# Patient Record
Sex: FEMALE | Race: WHITE
[De-identification: names, ages, dates, MRNs, and addresses within clinical notes are randomized per-mention and may not be internally consistent; named-entity substitution may affect disease eponyms.]

---

## 2020-03-14 ENCOUNTER — HOSPITAL ENCOUNTER (EMERGENCY)
Dept: HOSPITAL 25 - UCEAST | Age: 60
Discharge: HOME | End: 2020-03-14
Payer: COMMERCIAL

## 2020-03-14 VITALS — SYSTOLIC BLOOD PRESSURE: 112 MMHG | DIASTOLIC BLOOD PRESSURE: 78 MMHG
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
## 2020-03-14 DIAGNOSIS — Z91.030: ICD-10-CM

## 2020-03-14 DIAGNOSIS — S63.610A: Primary | ICD-10-CM
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
## 2020-03-14 DIAGNOSIS — Z88.1: ICD-10-CM

## 2020-03-14 PROCEDURE — G0463 HOSPITAL OUTPT CLINIC VISIT: HCPCS

## 2020-03-14 PROCEDURE — 99212 OFFICE O/P EST SF 10 MIN: CPT

## 2020-03-14 NOTE — XMS REPORT
Summary of Care

 Created on:2020



Patient:Jennifer Mccauley

Sex:Female

:1960

External Reference #:[id number]





Demographics







 Address  [address] [place] [address]

 

 Home Phone  [phone]

 

 Work Phone  [phone]

 

 Mobile Phone  [phone]

 

 Email Address  [email or web address]

 

 Preferred Language  English

 

 Marital Status  Not  or 

 

 Zoroastrian Affiliation  Unknown

 

 Race  White

 

 Ethnic Group  Not  or 









Author







 Organization  [facility]

 

 Address  [address] [place] [address]









Support







 Name  Relationship  Address  Phone

 

 Dawn Meyer  Spouse  [address]  [phone]



     [place] [address]  









Care Team Providers







 Name  Role  Phone

 

 Liz Julia YASIR ROMERO  Primary Care Provider  [phone]









Reason for Visit







 Reason  Comments

 

 Follow-up  OPSP Bilateral foot hammer toes







Encounter Details







 Date  Type  Department  Care Team  Description

 

 2020  Office Visit  Gerardo Huerta Daniel  Bilateral 
foot pain (Primary Dx);



     LLP



  M, [place]



  Hammer toes of both feet;



     [address]



  [address]



  S/P foot surgery, right



     [place], [place]  [address]



  



     [address]



  [place], [place]  



     [phone]  [address]



  



       [phone]  



       (Fax)  







Allergies







 Active Allergy  Reactions  Severity  Noted Date  Comments

 

 Bee Pollen  Anaphylaxis  High  2016  

 

 Levofloxacin In D5w  Hives    2016  

 

 Adhesive Tape  Other (See Comments)    2018  Blisters



documented as of this encounter (statuses as of 2020)



Medications







 Medication  Sig  Dispensed  Refills  Start Date  End Date  Status

 

 EPINEPHrine  Inject 0.3    0      Active



 (EPIPEN 1:1000)  mg into the          



 0.3 MG/0.3ML SOAJ  muscle.          

 

 linaclotide  Take 290 mcg    0      Active



 (LINZESS) 290 MCG  by mouth.          



 capsule            

 

 budesonide  Inhale 1 mg    0      Active



 (PULMICORT) 1  into the          



 MG/2ML nebulizer  lungs.          



 solution            

 

 levalbuterol  Inhale  into    0      Active



 (XOPENEX) 0.63  the lungs.          



 MG/3ML nebulizer            



 solution            

 

 montelukast      0  2016    Active



 (SINGULAIR) 10 MG            



 tablet            

 

 Motegrity 2 MG      0  2019    Active



 Oral Tablet            

 

 Doxycycline      0  2020  Discontinued



 Hyclate 100 MG          0  (Therapy



 Oral Tablet            completed)



 (VIBRA-TABS)            



documented as of this encounter (statuses as of 2020)



Active Problems







 Problem  Noted Date

 

 Contusion of right hand  2018

 

 Numbness and tingling in right hand  2018



documented as of this encounter (statuses as of 2020)



Social History







 Tobacco Use  Types  Packs/Day  Years Used  Date

 

 Never Smoker    0    









 Smokeless Tobacco: Never Used      









 Alcohol Use  Drinks/Week  oz/Week  Comments

 

 Yes      









 Sex Assigned at Birth  Date Recorded

 

 Not on file  









 Job Start Date  Occupation  Industry

 

 Not on file  Not on file  Not on file









 Travel History  Travel Start  Travel End









 No recent travel history available.



documented as of this encounter



Last Filed Vital Signs

Not on filedocumented in this encounter



Progress Notes

Duy Carrillo, PA - 2020  3:00 PM ESTFormatting of this note might 
be different from the original.

Chief Complaint

Patient presents with

 Follow-up

  OPSP Bilateral foot hammer toes



HPI: Jennifer was seen on behalf of  Dr. Jesus for evaluation of their  
Bilateral feet. Patient is status post remote right great toe fusion and 
subsequent hardware removal.  Hardware removal in .  Great toe fusion was 
in .  She has had progressive issues with her second and third toeson both 
feet.  Worse on the left than on the right.  States are starting to bend 
upwards.  They are rubbing on shoes.  They bother her when her when she runs 
and she wears certain shoes.  She denies numbness, tingly.  No new injuries.  
She has had no formal treatment.



Past Medical History:

Diagnosis Date

 Asthma

 IBS (irritable bowel syndrome)



Family History

Problem Relation Age of Onset

 Asthma Mother

 Thyroid disease Mother

 Heart disease Father

 Stroke Sister

 Cancer Paternal Grandfather

 Diabetes Paternal Grandfather



Past Surgical History:

Procedure Laterality Date

 FOOT SURGERY Right 2017

 removal of hardware- [facility]

 FOOT SURGERY Right

 melanoma  right big toe

 HAND SURGERY

 KNEE SURGERY Right 2019

 vocal cord nodules



Social History



Socioeconomic History

 Marital status: 

  Spouse name: Not on file

 Number of children: Not on file

 Years of education: Not on file

 Highest education level: Not on file

Occupational History

 Not on file

Social Needs

 Financial resource strain: Not on file

 Food insecurity:

  Worry: Not on file

  Inability: Not on file

 Transportation needs:

  Medical: Not on file

  Non-medical: Not on file

Tobacco Use

 Smoking status: Never Smoker

 Smokeless tobacco: Never Used

Substance and Sexual Activity

 Alcohol use: Yes

 Drug use: No

 Sexual activity: Not on file

Lifestyle

 Physical activity:

  Days per week: Not on file

  Minutes per session: Not on file

 Stress: Not on file

Relationships

 Social connections:

  Talks on phone: Not on file

  Gets together: Not on file

  Attends Anglican service: Not on file

  Active member of club or organization: Not on file

  Attends meetings of clubs or organizations: Not on file

  Relationship status: Not on file

 Intimate partner violence:

  Fear of current or ex partner: Not on file

  Emotionally abused: Not on file

  Physically abused: Not on file

  Forced sexual activity: Not on file

Other Topics Concern

 Not on file

Social History Narrative

 Not on file



Allergies

Allergen Reactions

 Bee Pollen Anaphylaxis

 Levaquin [Levofloxacin In D5w] Hives

 Tape [Adhesive Tape] Other (See Comments)

  Blisters



Current Outpatient Medications on File Prior to Visit

Medication Sig Dispense Refill

 budesonide (PULMICORT) 1 MG/2ML nebulizer solution Inhale 1 mg into the 
lungs.

 EPINEPHrine (EPIPEN 1:1000) 0.3 MG/0.3ML SOAJ Inject 0.3 mg into the 
muscle.

 levalbuterol (XOPENEX) 0.63 MG/3ML nebulizer solution Inhale  into the 
lungs.

 linaclotide (LINZESS) 290 MCG capsule Take 290 mcg by mouth.

 montelukast (SINGULAIR) 10 MG tablet

 Motegrity 2 MG Oral Tablet

 [DISCONTINUED] Doxycycline Hyclate 100 MG Oral Tablet (VIBRA-TABS)



No current facility-administered medications on file prior to visit.



Please see new patient information sheet for review of systems. These were 
reviewed today with Jennifer.



PHYSICAL EXAM:  There were no vitals taken for this visit.

Patient is a well developed, well nourished 59-year-old in no acute distress, 
sitting comfortably onthe exam table today.  The patient is alert and oriented, 
responds to questions appropriately, Walkswith a normal gait.  The patient has 
proper mood and affect.



Bilateral foot and ankle exam show hammertoe deformities of toes 2 and 3.  
Worse on the left than onthe right.  The deformities are mostly correctable.  
There is no tenderness over the metatarsal heads plantarly.  There is no 
movement at the first MTPJ on the right foot.  The second toe is stiff to the 
IPJ.



XRAYS: Multiple views of the bilateral feet were Reviewed in the office today.  
No acute findings.  Hammertoes of toes 2 on both sides.  Status post first MTPJ 
fusion with good bony fusion.  No hardware.



ASSESSMENT: symptomatic bilateral hammertoes



PLAN:  Treatment options were discussed. The natural history of the patient's 
problem was discussed with Jennifer.   We discussed treatment options.  She 
trialed the Dubin splint.  This was not very comfortable for her.  She is given 
a toe sleeve to protect the skin so it does not rub on shoes.  She was taught 
stretching.  She she is to avoid walking barefoot.  She understands that the 
only solution for this is surgical interventions.  She is not sure that she 
wants to pursue that at this time.  Sheunderstands should be strict 
nonweightbearing for 2 weeks and be in a boot for 6 weeks with a pin sticking 
out of her second toe.  She did inquire about operating on both sides of the 
same time or possibly coordinating with a right knee arthroscopy that needs to 
be done.  She will think about her options.  Return to office when necessary.  
Patient will call the office for any increasing troubles.



F/U Xrays: none



"This document was dictated using [employer] software.  
A reasonable attempt at proof reading has been made to minimize errors.  Please 
call our office if you have any questions."Electronically signed by ROM Painter at 2020  4:45 PM ESTdocumented in this encounter



Plan of Treatment







 Date  Type  Specialty  Care Team  Description

 

 2020  Office Visit  Orthopedic Surgery  Matthew Montanez MD



  



       [address] [address]



  



       [phone] (Fax)  









 Name  Type  Priority  Associated Diagnoses  Date/Time

 

 XR Foot 3 or More Views  Imaging  Routine  Bilateral foot pain  2020  3:
26 PM EST



 Bilateral        









 Name  Type  Priority  Associated Diagnoses  Order Schedule

 

 XR Foot 3 or More Views  Imaging  Routine  Bilateral foot pain  Expected: ,



 Bilateral        Expires: 2022









 Health Maintenance  Due Date  Last Done  Comments

 

 Hepatitis C Screening (B.  [birth year])      

 

 MMR Vaccines (1 of 1 - Standard  1961    



 series)      

 

 Varicella Vaccines (1 of 2 -  1961    



 2-dose childhood series)      

 

 DTaP,Tdap,and Td Vaccines (1 -  1967    



 Tdap)      

 

 HIV Screening  1973    

 

 Cervical Cancer Screening 5 years  1981    

 

 Breast Cancer Screening 2 years  2010    

 

 Colon Cancer Screening 10 yrs  2010    

 

 Influenza Vaccine  10/01/2019    

 

 Pneumococcal Vaccine: 65+ Years (1  2025    



 of 2 - PCV13)      

 

 HIB Vaccines  Aged Out    No longer eligible based on



       patient's age to complete this



       topic

 

 Hepatitis A Vaccines  Aged Out    No longer eligible based on



       patient's age to complete this



       topic

 

 Hepatitis B Vaccines  Aged Out    No longer eligible based on



       patient's age to complete this



       topic

 

 IPV Vaccines  Aged Out    No longer eligible based on



       patient's age to complete this



       topic

 

 Pneumococcal Vaccine: Pediatrics  Aged Out    No longer eligible based on



 (0 to 5 Years) and At-Risk      patient's age to complete this



 Patients (6 to 64 Years)      topic



documented as of this encounter



Results

Not on filedocumented in this encounter



Visit Diagnoses







 Diagnosis

 

 Bilateral foot pain - Primary







 Pain in limb

 

 Hammer toes of both feet

 

 S/P foot surgery, right



documented in this encounter

## 2020-03-14 NOTE — XMS REPORT
Summary of Care

 Created on:2020



Patient:Jennifer Mccauley

Sex:Female

:1960

External Reference #:[id number]





Demographics







 Address  [address] [place] [address]

 

 Home Phone  [phone]

 

 Work Phone  [phone]

 

 Mobile Phone  [phone]

 

 Email Address  [email or web address]

 

 Preferred Language  English

 

 Marital Status  Not  or 

 

 Gnosticist Affiliation  Unknown

 

 Race  White

 

 Ethnic Group  Not  or 









Author







 Organization  [facility]

 

 Address  [address] [place] [address]









Support







 Name  Relationship  Address  Phone

 

 Dawn Meyer  Spouse  [address] [facility]  [phone]



     [place] [address]  









Care Team Providers







 Name  Role  Phone

 

 Celialeno Julia YASIR ROMERO  Primary Care Provider  [phone]









Reason for Referral

External Surgery Case (Routine)





 Status  Reason  Specialty  Diagnoses / Procedures  Referred By Contact  
Referred To Contact

 

 Open      Diagnoses



S/P medial meniscectomy of right knee



Right anterior knee pain  Matthew Montanez,  



       



Procedures



Surgery Case Request, Outside Facilty ONLY  MD



  



         [address] [place] [address] [address]



  



         [place], [place]  



         [address]



  



         Phone: [phone]



  



         Fax: [phone]



  



         Email:  



         [email or web address]  









Reason for Visit







 Reason  Comments

 

 Follow-up  increased right knee pain







Encounter Details







 Date  Type  Department  Care Team  Description

 

 2020  Office Visit  [facility] Lisseth Umanzor John  S/P medial 
meniscectomy of right knee (Primary Dx);



     SULLY PLATA MD



  Right anterior knee pain



     [address] [facility] [address]



  [address], [place]  [address]



  



     [address]



  [facility]  



     [phone]  [place] [address]



  



       [phone]  



       (Fax)  







Allergies







 Active Allergy  Reactions  Severity  Noted Date  Comments

 

 Bee Pollen  Anaphylaxis  High  2016  

 

 Levofloxacin In D5w  Hives    2016  

 

 Adhesive Tape  Other (See Comments)    2018  Blisters



documented as of this encounter (statuses as of 2020)



Medications







 Medication  Sig  Dispensed  Refills  Start Date  End Date  Status

 

 EPINEPHrine (EPIPEN  Inject 0.3 mg    0      Active



 1:1000) 0.3 MG/0.3ML  into the muscle.          



 SOAJ            

 

 linaclotide (LINZESS)  Take 290 mcg by    0      Active



 290 MCG capsule  mouth.          

 

 budesonide (PULMICORT)  Inhale 1 mg into    0      Active



 1 MG/2ML nebulizer  the lungs.          



 solution            

 

 levalbuterol (XOPENEX)  Inhale  into the    0      Active



 0.63 MG/3ML nebulizer  lungs.          



 solution            

 

 montelukast (SINGULAIR)      0  2016    Active



 10 MG tablet            

 

 Motegrity 2 MG Oral      0  2019    Active



 Tablet            



documented as of this encounter (statuses as of 2020)



Active Problems







 Problem  Noted Date

 

 Contusion of right hand  2018

 

 Numbness and tingling in right hand  2018



documented as of this encounter (statuses as of 2020)



Social History







 Tobacco Use  Types  Packs/Day  Years Used  Date

 

 Never Smoker    0    









 Smokeless Tobacco: Never Used      









 Alcohol Use  Drinks/Week  oz/Week  Comments

 

 Yes      









 Sex Assigned at Birth  Date Recorded

 

 Not on file  









 Job Start Date  Occupation  Industry

 

 Not on file  Not on file  Not on file









 Travel History  Travel Start  Travel End









 No recent travel history available.



documented as of this encounter



Last Filed Vital Signs

Not on filedocumented in this encounter



Patient Instructions

Patient InstructionsSt Cristy Briggs LPN - 2020  4:00 PM ESTThe 
patient is instructed to call the office with any question/concerns or if 
symptoms worsen.

Electronically signed by Cristy Krishna LPN at 2020  4:08 PM EST

documented in this encounter



Progress Notes

Danyel Lee - 2020  4:00 PM ESTFormatting of this note might be 
different from the original.

Danyel Lee scribing the following service on behalf of Dr. Montanez.



Chief Complaint

Patient presents with

 Follow-up

  increased right knee pain



Jennifer Mccauley comes to the office for increased right knee pain. Her 
pain is medial and posterior. She continues to have increased pain at work. No 
new injury. no improvement with PT or steroid injection. No consitutional or 
neurological complaints.



Right knee: well-healed incisions. No effusion. Medial tenderness.  Good 
motion.  Stable. Patella tracks straight.  Calf is soft and nontender.  
Neurovascularly intact.



A 59 y.o. female s/p right knee arthroscopy and partial medial menisectomy, 
doing well.



Inherent/material risks, benefits, limitations and the treatment alternatives 
were discussed at length.  All questions were answered. She would like to 
proceed with repeat right knee arthroscopy and meniscal correction. Jennifer 
will return to  her primary care team  for preoperative clearance. She will 
speak to my  to schedule her surgery.



I, Matthew Montanez MD, have examined the patient and discussed relevant 
clinical findings. I agree with the above stated documentation. For details, 
please see above.



CC: Julia Hill, DO



Orders Placed This Encounter

 Surgery Case Request, Outside Facilty ONLY

Electronically signed by Matthew Montanez MD at 2020  6:35 AM 
ESTdocumented in this encounter



Plan of Treatment







 Health Maintenance  Due Date  Last Done  Comments

 

 Hepatitis C Screening (B.  [birth year])      

 

 MMR Vaccines (1 of 1 - Standard  1961    



 series)      

 

 Varicella Vaccines (1 of 2 -  1961    



 2-dose childhood series)      

 

 DTaP,Tdap,and Td Vaccines (1 -  1967    



 Tdap)      

 

 HIV Screening  1973    

 

 Cervical Cancer Screening 5 years  1981    

 

 Breast Cancer Screening 2 years  2010    

 

 Colon Cancer Screening 10 yrs  2010    

 

 Influenza Vaccine  10/01/2019    

 

 Pneumococcal Vaccine: 65+ Years (1  2025    



 of 2 - PCV13)      

 

 HIB Vaccines  Aged Out    No longer eligible based on



       patient's age to complete this



       topic

 

 Hepatitis A Vaccines  Aged Out    No longer eligible based on



       patient's age to complete this



       topic

 

 Hepatitis B Vaccines  Aged Out    No longer eligible based on



       patient's age to complete this



       topic

 

 IPV Vaccines  Aged Out    No longer eligible based on



       patient's age to complete this



       topic

 

 Pneumococcal Vaccine: Pediatrics  Aged Out    No longer eligible based on



 (0 to 5 Years) and At-Risk      patient's age to complete this



 Patients (6 to 64 Years)      topic



documented as of this encounter



Procedures







 Procedure Name  Priority  Date/Time  Associated Diagnosis  Comments

 

 SURGERY CASE REQUEST  Routine  2020  4:41 PM  S/P medial meniscectomy  



 OUTSIDE FACILITY ONLY    EST  of right knee



  



       Right anterior knee pain  



documented in this encounter



Results

Not on filedocumented in this encounter



Visit Diagnoses







 Diagnosis

 

 S/P medial meniscectomy of right knee - Primary

 

 Right anterior knee pain







 Pain in joint, lower leg



documented in this encounter

## 2020-03-14 NOTE — UC
Upper Extremity HPI





- HPI Summary


HPI Summary: 





60yo EF p/w right index finger injury while applying tape and snapped sideways, 

denies bony tenderness bur ROM restricted due to pain





- History of Current Complaint


Chief Complaint: UCUpperExtremity


Stated Complaint: HAND INJURY


Time Seen by Provider: 03/14/20 07:28


Hx Obtained From: Patient


Hx Last Menstrual Period: 2 yrs


Onset/Duration: Sudden Onset


Severity Initially: Moderate


Severity Currently: Moderate


Pain Intensity: 3


Character: Sharp, Stiffness


Aggravating Factor(s): Movement, Lifting, Flexion, Extension


Alleviating Factor(s): Nothing


Associated Signs And Symptoms: Positive: Negative





- Allergies/Home Medications


Allergies/Adverse Reactions: 


 Allergies











Allergy/AdvReac Type Severity Reaction Status Date / Time


 


levofloxacin [From Levaquin] Allergy  Hives Verified 03/14/20 07:29


 


rofecoxib [From Vioxx] Allergy  Vomiting Verified 03/14/20 07:29


 


bee stings Allergy  Anaphylatic Uncoded 03/14/20 07:29





   Shock  











Home Medications: 


 Home Medications





Budesonide Flexhaler 90 (NF) [Pulmicort Flexhaler 90 mcg/act (NF)] 1 puff INH 

DAILY PRN 08/26/14 [History Confirmed 03/14/20]


Epi Pen 0.3 ml INJ SEE INSTRUCTIONS PRN 08/26/14 [History Confirmed 03/14/20]


Fluticasone NASAL SPRAY 50MCG* [Fluticasone NASAL SPRAY*] 2 spray BOTH NARES 

DAILY 08/26/14 [History Confirmed 03/14/20]


Levalbuterol HFA INHALER* [Xopenex Hfa Inhaler*] 2 inh INH QAM PRN 08/26/14 [

History Confirmed 03/14/20]


Montelukast Sodium TAB* [Singulair TAB*] 10 mg PO BEDTIME 08/26/14 [History 

Confirmed 03/16/16]


Ferrous Bis-Glycinate Chelate [Ferrous Bisglycinate Margarita] 25 mg PO QPM 03/18/16 

[History Confirmed 03/14/20]


Linaclotide (NF) [Linzess (NF)] 290 mcg PO QAM 03/18/16 [History Confirmed 03/14 /20]


Multivitamin [Multivitamins] 1 cap PO DAILY 03/18/16 [History Confirmed 03/14/20

]


Tylenol P M 2 tab PO BEDTIME PRN 03/18/16 [History Confirmed 03/14/20]


Prucalopride Succinate [Motegrity] 2 mg PO BEDTIME 03/14/20 [History Confirmed 

03/14/20]











PMH/Surg Hx/FS Hx/Imm Hx


Previously Healthy: Yes





- Surgical History


Surgical History: Yes


Surgery Procedure, Year, and Place: right foot





- Family History


Known Family History: Positive: Non-Contributory





- Social History


Alcohol Use: Occasionally


Substance Use Type: None


Smoking Status (MU): Never Smoked Tobacco





Review of Systems


All Other Systems Reviewed And Are Negative: Yes


Constitutional: Positive: Negative


Eyes: Positive: Negative


ENT: Positive: Negative


Respiratory: Positive: Negative


Cardiovascular: Positive: Negative


Gastrointestinal: Positive: Negative


Genitourinary: Positive: Negative


Motor: Positive: Negative


Neurovascular: Positive: Negative


Musculoskeletal: Positive: Other: - right index finger pain


Neurological/Mental Status: Positive: Negative





Physical Exam





- Summary


Physical Exam Summary: 


Vital Signs Reviewed: Yes


Eye Exam: Normal


Eyes: Positive: Conjunctiva Clear


ENT: Positive: Normal ENT inspection


Neck: Positive: Supple


Respiratory Exam: Normal


Respiratory: Positive: Lungs clear, Normal breath sounds.  Negative: Crackles, 

Rhonchi, Stridor, Wheezing


Cardiovascular Exam: Normal, RRR, S1, S2


Abdomen: NT/ND


Musculoskeletal Exam: TTP over right 2nd MCJ due to ROM intact but limited due 

to pain


Neurological Exam: Normal


Psychological Exam: Normal


Skin Exam: Normal








Triage Information Reviewed: Yes


Vital Signs: 


 Initial Vital Signs











Temp  36.8 C   03/14/20 07:24


 


Pulse  56   03/14/20 07:24


 


Resp  16   03/14/20 07:24


 


BP  112/78   03/14/20 07:24


 


Pulse Ox  96   03/14/20 07:24














Upper Extremity Course/Dx





- Course


Course Of Treatment: 


XR of hand/finger betty for fx








- Differential Dx/Diagnosis


Provider Diagnosis: 


 Sprain of index finger








Discharge ED





- Sign-Out/Discharge


Documenting (check all that apply): Patient Departure


All imaging exams completed and their final reports reviewed: Yes





- Discharge Plan


Condition: Stable


Disposition: HOME


Patient Education Materials:  Finger Sprain (ED)


Referrals: 


Julia Hill DO [Primary Care Provider] - 





- Billing Disposition and Condition


Condition: STABLE


Disposition: Home

## 2020-03-14 NOTE — XMS REPORT
Continuity of Care Document (CCD)

 Created on:2020



Patient:Jennifer Mccauley

Sex:Female

:1960

External Reference #:[id number]





Demographics







 Address  [address] [place] [address]

 

 Home Phone  [phone]

 

 Mobile Phone  [phone]

 

 Work Phone  [phone]

 

 Email Address  [email or web address]

 

 Preferred Language  Unknown

 

 Marital Status  Unknown

 

 Faith Affiliation  Unknown

 

 Race  Unknown

 

 Ethnic Group  Unknown









Author







 Name  Arnoldo Jensen MD

 

 Address  [address] [place] [address]









Problems







 Active Problems  Provider  Date

 

 Adult health examination    Onset: 2019

 

 Pain in right knee    Onset: 2019

 

 Asthma    Onset: 2015







Social History







 Type  Date  Description  Comments

 

 Birth Sex    Unknown  

 

 Tobacco Use  Start: Unknown  Patient has never smoked  

 

 Smoking Status  Reviewed: 20  Patient has never smoked  







Allergies, Adverse Reactions, Alerts







 Active Allergies  Reaction  Severity  Comments  Date

 

 Bee Stings  No Reaction Indicated      2019

 

 Levofloxacin  rash      2019







Medications







 Active Medications  SIG  Qnty  Indications  Ordering  Date



         Provider  

 

 Pulmicort  2  twice daily  360units    Arnoldo Jensen MD  2020



 Flexhaler  Inhalation        



           



 180mcg/Act Aerosol          



           

 

 Dymista  1 spray twice daily  69units    Unknown  2019



   per nostril Nasal        



 137-50mcg/Act          



 Suspension          



           

 

 Linzess  Oral; Take 1  90caps    Unknown  02/15/2019



          290mcg  Capsule Daily        



 Capsules          



           

 

 Epinephrine  injection; use as  2units    Arnoldo Jensen MD  2018



   directed by        



 0.3mg/0.3ML  prescriber or        



 Solution  package        



 Auto-Inject  instructions        



           

 

 Singulair  1 daily oral  90tabs    Julia  2018



            10mg        Karnow, DO  



 Tablets          



           

 

 Xopenex HFA  Inhalation; Use 2  45units    Unknown  2018



   Inhalations By        



 45mcg/Act Aerosol  Mouth Every 6 Hours        



           

 

 Motegrity  1 tab by mouth      Unknown  



            2mg  every day        



 Tablets          



           









 History Medications









 Doxycycline Hyclate  take 1 tablet by  14tabs    Edie Luke MD  2020 -



   mouth twice        02/10/2020



 100mg Tablets  daily for 7 days        



           

 

 Prednisone  Oral; Take 6  40tabs    Reba López, FNP  2019 -



          10mg Tablets  tabs daily for 3        02/10/2020



   days - then 4        



   tabs daily for 3        



   days - then 2        



   tabs daily for 3        



   days - then 1        



   tab daily for 3        



   days - then stop        







Medications Administered in Office







 Medication  SIG  Qnty  Indications  Ordering Provider  Date

 

 DTaP Vaccine Younger Than 7        Unknown  2007



 (Infanrix)          



 Injection          







Immunizations







 CPT Code  Status  Date  Vaccine  Lot #

 

 [id number]  Given  10/01/2019  Flu < 65 years  

 

 68359  Given  2019  Tdap - Boostrix/Adacel  

 

 36090  Given  2018  Flu < 65 years  

 

 87294  Given  2018  Shingrix - Shingles vaccine, Herpes Zoster  

 

 95802  Given  2018  Shingrix - Shingles vaccine, Herpes Zoster  

 

 54729  Given  2018  Prevnar 13  

 

 98946  Given  2017  Flu < 65 years  

 

 16418  Given  2015  Pneumovax - for >=2years - PPSV23  







Vital Signs







 Date  Vital  Result  Comment

 

 2020  4:12pm  BP Systolic  112 mmHg  









 BP Diastolic  74 mmHg  

 

 Height  64.50 inches  5'4.50"

 

 Weight  128.00 lb  

 

 Heart Rate  56 /min  

 

 Body Temperature  97.5 F  

 

 O2 % BldC Oximetry  97 %  

 

 BMI (Body Mass Index)  21.6 kg/m2  









 2020  3:23pm  BP Systolic  122 mmHg  









 BP Diastolic  78 mmHg  

 

 Heart Rate  56 /min  

 

 Body Temperature  97.9 F  

 

 O2 % BldC Oximetry  95 %  







Results







 Description

 

 No Information Available







Procedures







 Description

 

 No Information Available







Medical Devices







 Description

 

 No Information Available







Encounters







 Type  Date  Location  Provider  Dx  Diagnosis

 

 Office Visit  2020  [facility] Fidencio Jensen MD  J45.20  Mild intermittent



   4:00p        asthma, uncomplicated









 R06.2  Wheezing

 

 H69.92  Unspecified Eustachian tube disorder, left ear









 Office Visit  2020  3:30p  [facility] Fidencio Luke  J45.901  
Unspecified asthma



       MD    with (acute)



           exacerbation







Assessments







 Date  Code  Description  Provider

 

 2020  STAN45.20  Mild intermittent asthma, uncomplicated  Arnoldo Jensen MD

 

 2020  R06.2  Wheezing  Arnoldo Jensen MD

 

 2020  H69.92  Unspecified Eustachian tube disorder, left ear  Arnoldo Jensen MD

 

 2020  J45.901  Unspecified asthma with (acute) exacerbation  Edie Luke MD







Plan of Treatment

Future Appointment(s):2020  8:45 am - Julia Hill, DO at [facility] - Arnoldo Jensen MDJ45.20 Mild intermittent asthma, nfdwolgwpcnpwR37.2 
RylhmpfwB60.92 Unspecified Eustachian tube disorder, left earAllNew Medication:
Pulmicort Flexhaler 180 mcg/Act - 2  twice daily Inhalation



Functional Status







 Description

 

 No Information Available







Mental Status







 Description

 

 No Information Available







Referrals







 Description

 

 No Information Available

## 2020-03-14 NOTE — XMS REPORT
Continuity of Care Document (CCD)

 Created on:2020



Patient:Jennifer Mccauley

Sex:Female

:1960

External Reference #:[id number]





Demographics







 Address  [address] [place] [address]

 

 Home Phone  [phone]

 

 Mobile Phone  [phone]

 

 Work Phone  [phone]

 

 Email Address  [email or web address]

 

 Preferred Language  Unknown

 

 Marital Status  Unknown

 

 Gnosticism Affiliation  Unknown

 

 Race  Unknown

 

 Ethnic Group  Unknown









Author







 Name  Edie Luke MD

 

 Address  [address] [place] [address]









Problems







 Active Problems  Provider  Date

 

 Pain in right knee    Onset: 2019

 

 Asthma    Onset: 2015









 Inactive Problems









 Tear of medial meniscus of knee    Onset: 2019









 Inactive: 2019









 Knee pain    Onset: 2019









 Inactive: 2019









 Pre-surgery evaluation    Onset: 2019









 Inactive: 2019







Social History







 Type  Date  Description  Comments

 

 Birth Sex    Unknown  

 

 Tobacco Use  Start: Unknown  Patient has never smoked  

 

 Smoking Status  Reviewed: 20  Patient has never smoked  







Allergies, Adverse Reactions, Alerts







 Active Allergies  Reaction  Severity  Comments  Date

 

 Bee Stings  No Reaction Indicated      2019

 

 Levofloxacin  rash      2019







Medications







 Active Medications  SIG  Qnty  Indications  Ordering  Date



         Provider  

 

 Doxycycline Hyclate  take 1 tablet by  14tabs    Edie Luke,  2020



   mouth twice daily      MD  



 100mg Tablets  for 7 days        



           

 

 Prednisone  Oral; Take 6 tabs  40tabs    Reba Aittama,  2019



            10mg  daily for 3 days -      FNP  



 Tablets  then 4 tabs daily        



   for 3 days - then 2        



   tabs daily for 3        



   days - then 1 tab        



   daily for 3 days -        



   then stop        

 

 Dymista  1 spray twice daily  69units    Unknown  2019



   per nostril Nasal        



 137-50mcg/Act          



 Suspension          



           

 

 Polyethylene Glycol  Oral; Fill The Cap  1530units    Unknown  2019



 3350  To Line, Mix And        



      3350NF Powder  Drink Once Daily        



           

 

 Linzess  Oral; Take 1  90caps    Unknown  02/15/2019



         290mcg  Capsule Daily        



 Capsules          



           

 

 Epinephrine  injection; use as  2units    Arnoldo Jensen MD  2018



   directed by        



 0.3mg/0.3ML  prescriber or        



 Solution  package        



 Auto-Inject  instructions        



           

 

 Singulair  1 daily oral  90tabs    Julia  2018



           10mg        Karnow, DO  



 Tablets          



           

 

 Linzess  1  Daily Oral; Id #  90caps    Unknown  2018



         290mcg  [id number]        



 Capsules          



           

 

 Xopenex HFA  Inhalation; Use 2  45units    Unknown  2018



   Inhalations By        



 45mcg/Act Aerosol  Mouth Every 6 Hours        



           

 

 Pulmicort Flexhaler  2  twice daily  360units    Unknown  2017



   Inhalation        



 180mcg/Act Aerosol          



           

 

 Motegrity  1 tab by mouth      Unknown  



           2mg  every day        



 Tablets          



           







Medications Administered in Office







 Medication  SIG  Qnty  Indications  Ordering Provider  Date

 

 DTaP Vaccine Younger Than 7        Unknown  2007



 (Infanrix)          



 Injection          







Immunizations







 CPT Code  Status  Date  Vaccine  Lot #

 

 [id number]  Given  2019  Tdap - Boostrix/Adacel  

 

 14000  Given  2018  Flu < 65 years  

 

 38587  Given  2018  Shingrix - Shingles vaccine, Herpes Zoster  

 

 38152  Given  2018  Shingrix - Shingles vaccine, Herpes Zoster  

 

 76819  Given  2018  Prevnar 13  

 

 76252  Given  2017  Flu < 65 years  

 

 86660  Given  2015  Pneumovax - for >=2years - PPSV23  

 

 00891  Given  2015  Flu < 65 years  

 

 87762  Given  10/04/2014  Flu < 65 years  

 

 26601  Given  2013  Flu < 65 years  

 

 53309  Given  11/10/2012  Flu < 65 years  







Vital Signs







 Date  Vital  Result  Comment

 

 2020  3:23pm  BP Systolic  122 mmHg  









 BP Diastolic  78 mmHg  

 

 Heart Rate  56 /min  

 

 Body Temperature  97.9 F  

 

 O2 % BldC Oximetry  95 %  









 2019  2:39pm  BP Systolic  114 mmHg  









 Heart Rate  57 /min  

 

 Body Temperature  97.6 F  

 

 O2 % BldC Oximetry  98 %  







Results







 Test  Acquired Date  Facility  Test  Result  H/L  Range  Note

 

 Lymph%  2019  N2N/CCD Import  Lymph%  30.4 %    20 - 45 %  

 

 MCH  2019  N2N/CCD Import  MCH  30 pg    27-31 pg  

 

 MCHC  2019  N2N/CCD Import  MCHC  34 g/dL    31-36  



             g/dL  

 

 MCV  2019  N2N/CCD Import  MCV  89 fL    80-97 fL  

 

 Mono#  2019  N2N/CCD Import  Mono#  0.3    0-0.8 10  



         10_3/ul    3/ul  

 

 Mono%  2019  N2N/CCD Import  Mono%  7.2 %    0 - 10 %  

 

 MPV  2019  N2N/CCD Import  MPV  8.4 fL    7.4-10.4  



             fL  

 

 Neut#  2019  N2N/CCD Import  Neut#  2.6    1.5-7.7  



         10_3/ul    10 3/ul  

 

 Neut%  2019  N2N/CCD Import  Neut%  57.4 %    45 - 70 %  

 

 NRBC#  2019  N2N/CCD Import  NRBC#  0.0      



         10_3/ul      

 

 NRBC%  2019  N2N/CCD Import  NRBC%  0.0 _      

 

 Platelets  2019  N2N/CCD Import  Platelets  239    150-450  



         10_3/uL    10 3/uL  

 

 Potassium  2019  N2N/CCD Import  Potassium  3.9 mmol/L    3.5-5.0  



             mmol/L  

 

 RBC  2019  N2N/CCD Import  RBC  4.24    3.70-4.87  



         10_6_/uL    10 6 /uL  

 

 RDW  2019  N2N/CCD Import  RDW  13 %    10-15 %  

 

 Sodium  2019  N2N/CCD Import  Sodium  140 mmol/L    135-145  



             mmol/L  

 

 WBC  2019  N2N/CCD Import  WBC  4.5    3.5-10.8  



         10_3/uL    10 3/uL  

 

 Anion Gap  2019  N2N/CCD Import  Anion Gap  6 mmol/L    2-11  



             mmol/L  

 

 Baso#  2019  N2N/CCD Import  Baso#  0.0    0-0.2 10  



         10_3/ul    3/ul  

 

 Baso%  2019  N2N/CCD Import  Baso%  0.8 %    0 - 2 %  

 

 BUN  2019  N2N/CCD Import  BUN  25 mg/dL  High  6-24  



             mg/dL  

 

 BUN/Creat Ratio  2019  N2N/CCD Import  BUN/Creat Ratio  30.9 _  High  8-
20  

 

 Calcium  2019  N2N/CCD Import  Calcium  9.0 mg/dL    8.6-10.3  



             mg/dL  

 

 Chloride  2019  N2N/CCD Import  Chloride  105 mmol/L    101-111  



             mmol/L  

 

 Co2  2019  N2N/CCD Import  Co2  29 mmol/L    22-32  



             mmol/L  

 

 Creatinine  2019  N2N/CCD Import  Creatinine  0.81 mg/dL    0.51-0.95  



             mg/dL  

 

 Eosin#  2019  N2N/CCD Import  Eosin#  0.2    0-0.6 10  



         10_3/ul    3/ul  

 

 Eosin%  2019  N2N/CCD Import  Eosin%  4.2 %    0 - 5 %  

 

 GFR Afr Amer  2019  N2N/CCD Import  GFR Afr Amer  87.6 _    >60  

 

 GFR Non Afr  2019  N2N/CCD Import  GFR Non Afr  72.4 _    >60  



 Amer      Amer        

 

 Glucose  2019  N2N/CCD Import  Glucose  70 mg/dL      



             mg/dL  

 

 Hematocrit  2019  N2N/CCD Import  Hematocrit  38 %    35-47 %  

 

 Hemoglobin  2019  N2N/CCD Import  Hemoglobin  12.6 g/dL    12.0-16.0  



             g/dL  

 

 Lymph#  2019  N2N/CCD Import  Lymph#  1.4    1.0-4.8  



         10_3/ul    10 3/ul  







Procedures







 Description

 

 No Information Available







Medical Devices







 Description

 

 No Information Available







Encounters







 Type  Date  Location  Provider  Dx  Diagnosis

 

 Office Visit  2020  [facility]  Edie Luke MD  J45.901  Unspecified 
asthma



   3:30p        with (acute)



           exacerbation







Assessments







 Date  Code  Description  Provider

 

 2020  J45.901  Unspecified asthma with (acute) exacerbation  Edie Luke MD







Plan of Treatment

Future Appointment(s):2020  8:45 am - Julia Hill DO at [facility] - Edie Luke MDJ45.901 Unspecified asthma with (acute) 
exacerbationAllNew Medication:Doxycycline Hyclate 100 mg - take 1 tablet by 
mouth twice daily for 7 days



Functional Status







 Description

 

 No Information Available







Mental Status







 Description

 

 No Information Available







Referrals







 Description

 

 No Information Available

## 2020-03-14 NOTE — XMS REPORT
Summary of Care

 Created on:2020



Patient:Jennifer Mccauley

Sex:Female

:1960

External Reference #:[id number]





Demographics







 Address  [address] [place] [address]

 

 Home Phone  [phone]

 

 Work Phone  [phone]

 

 Mobile Phone  [phone]

 

 Email Address  [email or web address]

 

 Preferred Language  English

 

 Marital Status  Not  or 

 

 Sabianism Affiliation  Unknown

 

 Race  White

 

 Ethnic Group  Not  or 









Author







 Organization  [facility]

 

 Address  [address] [place] [address]









Support







 Name  Relationship  Address  Phone

 

 Dawn Meyer  Spouse  [address]  [phone]



     [place] [address]  









Care Team Providers







 Name  Role  Phone

 

 Julia Hill YASIR ROMERO  Primary Care Provider  [phone]









Reason for Visit







 Reason  Comments

 

 Results  MRI right knee







Encounter Details







 Date  Type  Department  Care Team  Description

 

 2020  [facility]  S/SHERLYN right 
knee



     LLP



  MD SHERLYN



  arthroscopy (Primary



     [address]  [address]



  [address]



  Dx)



     [place], [place]  [address]



  



     [address]



  [place],  



     [phone]  [place] [address]



  



       [phone]  



       (Fax)  







Allergies







 Active Allergy  Reactions  Severity  Noted Date  Comments

 

 Bee Pollen  Anaphylaxis  High  2016  

 

 Levofloxacin In D5w  Hives    2016  

 

 Adhesive Tape  Other (See Comments)    2018  Blisters



documented as of this encounter (statuses as of 2020)



Medications







 Medication  Sig  Dispensed  Refills  Start Date  End Date  Status

 

 EPINEPHrine (EPIPEN  Inject 0.3 mg    0      Active



 1:1000) 0.3 MG/0.3ML  into the muscle.          



 SOAJ            

 

 linaclotide (LINZESS)  Take 290 mcg by    0      Active



 290 MCG capsule  mouth.          

 

 budesonide (PULMICORT)  Inhale 1 mg into    0      Active



 1 MG/2ML nebulizer  the lungs.          



 solution            

 

 levalbuterol (XOPENEX)  Inhale  into the    0      Active



 0.63 MG/3ML nebulizer  lungs.          



 solution            

 

 montelukast (SINGULAIR)      0  2016    Active



 10 MG tablet            

 

 Motegrity 2 MG Oral      0  2019    Active



 Tablet            

 

 Doxycycline Hyclate 100      0  2020    Active



 MG Oral Tablet            



 (VIBRA-TABS)            



documented as of this encounter (statuses as of 2020)



Active Problems







 Problem  Noted Date

 

 Contusion of right hand  2018

 

 Numbness and tingling in right hand  2018



documented as of this encounter (statuses as of 2020)



Social History







 Tobacco Use  Types  Packs/Day  Years Used  Date

 

 Never Smoker    0    









 Smokeless Tobacco: Never Used      









 Alcohol Use  Drinks/Week  oz/Week  Comments

 

 Yes      









 Sex Assigned at Birth  Date Recorded

 

 Not on file  









 Job Start Date  Occupation  Industry

 

 Not on file  Not on file  Not on file









 Travel History  Travel Start  Travel End









 No recent travel history available.



documented as of this encounter



Last Filed Vital Signs

Not on filedocumented in this encounter



Progress Notes

Matthew Montanez MD - 2020  1:45 PM ESTFormatting of this note might 
be different from the original.





Chief Complaint

Patient presents with

 Results

  MRI right knee



HPI:

Jennifer Mccauley comes to the office.   She is a very pleasant 59-year-old 
lady who complains ofongoing right knee pain status post arthroscopy and 
debridement in 2019.  She initially did well and then shortly thereafter 
she noted the onset of medial pain popping catching and swelling.  This is 
failed to improve with a pretty extensive conservative program postoperatively.
  She is here post MRI.



PMH, PSH, Current Medications, Allergies/Reactions, Family History, Social 
History, and Review of Systems are documented in the electronic medical record 
and are reviewed.



Vitals: There were no vitals taken for this visit. alert oriented x3 in no 
acute distress.  Walks with good gait.







Exam:

Right knee: Skin is intact.  There is well-healed incisions.  Joint line 
tenderness medial more thanlateral.  There is some mild crepitus with range of 
motion.  Mild effusion.  Good motion.  Stable.  Pain on Jeff without pop.  
Calf is soft and nontender.  Neurovascular intact.



Imaging:

MRI of the right knee: Images are reviewed.

Pace that



Impression:

Right knee pain post arthroscopy and debridement.  Possible further tearing of 
the medial meniscus given the historical presentation of her symptoms 
postoperatively in a delayed fashion.



Plan:

Options were discussed at length.  At this point will proceed on with 
conservative program.  I will see her back in 3 months.



No orders of the defined types were placed in this encounter.



CC: Julia Hill, 



This document was dictated using [employer] software.  A 
reasonable attempt at proof reading has been made to minimize errors.  Please 
call our office if you have any questions.Electronically signed by Matthew Montanez MD at 2020  5:45 PM ESTdocumented in this encounter



Plan of Treatment







 Date  Type  Specialty  Care Team  Description

 

 2020  Office Visit  Orthopedic Surgery  Duy Carrillo PA



  



       [address]



  



       [address]



  



       [place] [address] [phone] (Fax)  

 

 2020  Office Visit  Orthopedic Surgery  Matthew Montanez MD



  



       [address]



  



       [address]



  



       [place] [address] [phone] (Fax)  









 Health Maintenance  Due Date  Last Done  Comments

 

 Hepatitis C Screening (B.  1960    



 9475-1661)      

 

 MMR Vaccines (1 of 1 - Standard  1961    



 series)      

 

 Varicella Vaccines (1 of 2 -  1961    



 2-dose childhood series)      

 

 DTaP,Tdap,and Td Vaccines (1 -  1967    



 Tdap)      

 

 HIV Screening  1973    

 

 Cervical Cancer Screening 5 years  1981    

 

 Breast Cancer Screening 2 years  2010    

 

 Colon Cancer Screening 10 yrs  2010    

 

 Influenza Vaccine  10/01/2019    

 

 Pneumococcal Vaccine: 65+ Years (1  2025    



 of 2 - PCV13)      

 

 HIB Vaccines  Aged Out    No longer eligible based on



       patient's age to complete this



       topic

 

 Hepatitis A Vaccines  Aged Out    No longer eligible based on



       patient's age to complete this



       topic

 

 Hepatitis B Vaccines  Aged Out    No longer eligible based on



       patient's age to complete this



       topic

 

 IPV Vaccines  Aged Out    No longer eligible based on



       patient's age to complete this



       topic

 

 Pneumococcal Vaccine: Pediatrics  Aged Out    No longer eligible based on



 (0 to 5 Years) and At-Risk      patient's age to complete this



 Patients (6 to 64 Years)      topic



documented as of this encounter



Results

Not on filedocumented in this encounter



Visit Diagnoses







 Diagnosis

 

 S/P right knee arthroscopy - Primary







 Other postprocedural status



documented in this encounter